# Patient Record
Sex: FEMALE | Race: WHITE
[De-identification: names, ages, dates, MRNs, and addresses within clinical notes are randomized per-mention and may not be internally consistent; named-entity substitution may affect disease eponyms.]

---

## 2020-06-24 ENCOUNTER — HOSPITAL ENCOUNTER (EMERGENCY)
Dept: HOSPITAL 74 - JER | Age: 75
Discharge: HOME | End: 2020-06-24
Payer: COMMERCIAL

## 2020-06-24 VITALS — SYSTOLIC BLOOD PRESSURE: 141 MMHG | DIASTOLIC BLOOD PRESSURE: 65 MMHG | HEART RATE: 57 BPM | TEMPERATURE: 97.4 F

## 2020-06-24 VITALS — BODY MASS INDEX: 40.4 KG/M2

## 2020-06-24 DIAGNOSIS — M25.561: Primary | ICD-10-CM

## 2020-06-24 LAB
ALBUMIN SERPL-MCNC: 3.7 G/DL (ref 3.4–5)
ALP SERPL-CCNC: 152 U/L (ref 45–117)
ALT SERPL-CCNC: 35 U/L (ref 13–61)
ANION GAP SERPL CALC-SCNC: 7 MMOL/L (ref 8–16)
APTT BLD: 28.2 SECONDS (ref 25.2–36.5)
AST SERPL-CCNC: 15 U/L (ref 15–37)
BASOPHILS # BLD: 1.1 % (ref 0–2)
BILIRUB SERPL-MCNC: 0.4 MG/DL (ref 0.2–1)
BUN SERPL-MCNC: 18.2 MG/DL (ref 7–18)
CALCIUM SERPL-MCNC: 9.3 MG/DL (ref 8.5–10.1)
CHLORIDE SERPL-SCNC: 107 MMOL/L (ref 98–107)
CO2 SERPL-SCNC: 27 MMOL/L (ref 21–32)
CREAT SERPL-MCNC: 1.1 MG/DL (ref 0.55–1.3)
DEPRECATED RDW RBC AUTO: 13.8 % (ref 11.6–15.6)
EOSINOPHIL # BLD: 3.4 % (ref 0–4.5)
GLUCOSE SERPL-MCNC: 133 MG/DL (ref 74–106)
HCT VFR BLD CALC: 37.5 % (ref 32.4–45.2)
HGB BLD-MCNC: 12.4 GM/DL (ref 10.7–15.3)
INR BLD: 0.86 (ref 0.83–1.09)
LYMPHOCYTES # BLD: 35.5 % (ref 8–40)
MCH RBC QN AUTO: 29.7 PG (ref 25.7–33.7)
MCHC RBC AUTO-ENTMCNC: 33.1 G/DL (ref 32–36)
MCV RBC: 89.9 FL (ref 80–96)
MONOCYTES # BLD AUTO: 11.4 % (ref 3.8–10.2)
NEUTROPHILS # BLD: 48.6 % (ref 42.8–82.8)
PLATELET # BLD AUTO: 235 K/MM3 (ref 134–434)
PMV BLD: 8.3 FL (ref 7.5–11.1)
POTASSIUM SERPLBLD-SCNC: 4.2 MMOL/L (ref 3.5–5.1)
PROT SERPL-MCNC: 6.7 G/DL (ref 6.4–8.2)
PT PNL PPP: 10.1 SEC (ref 9.7–13)
RBC # BLD AUTO: 4.17 M/MM3 (ref 3.6–5.2)
SODIUM SERPL-SCNC: 141 MMOL/L (ref 136–145)
WBC # BLD AUTO: 5.7 K/MM3 (ref 4–10)

## 2020-06-24 NOTE — PDOC
Rapid Medical Evaluation


Chief Complaint: Pain, Acute


Time Seen by Provider: 06/24/20 20:39


Medical Evaluation: 





06/24/20 20:41


74  year old female pmhx HTn on ASA complaiing of 1 day of right calf pain and 

swelling, worse with ambulation.  Negative DVT risk factors.  Denies injury to 

leg, CP SOB f/c.





PE:  Right calf tense on lateral aspect ttp 


Left calf supple 





Plan:DVT R/o





US venous doppler 


Basic Labs if positive 





Pt to precede to ED for eval

## 2020-06-24 NOTE — PDOC
History of Present Illness





- General


Chief Complaint: Pain, Acute


Stated Complaint: RT KNEE PAIN


Time Seen by Provider: 06/24/20 20:39


History Source: Patient





- History of Present Illness


Initial Comments: 





06/24/20 21:02


74F w/hx HTN p/w one day of RLE pain, tenderness. No hx prior DVTs, denies any 

recent travel, car rides, hospital stays, [place] history, shortness of breath, chest

pain, palpitations, fevers, chills, weakness, confusion. 





Past History





- Medical History


Allergies/Adverse Reactions: 


                                    Allergies











Allergy/AdvReac Type Severity Reaction Status Date / Time


 


No Known Allergies Allergy   Verified 06/24/20 20:42














- Psycho-Social/Smoking History


Smoking History: Never smoked





- Substance Abuse Hx (Audit-C & DAST Scrn)


How often the patient has a drink containing alcohol: Monthly or less


Score: In Men: 4 or > Positive; In Women: 3 or > Positive: 1


Screen Result (Pos requires Nsg. Audit-10AR): Negative


In the last yr the pt used illegal drug/Rx for NonMed reason: No


Score:  Yes response is considered Positive: 0


Screen Result (Positive result requires Nsg. DAST-10): Negative





**Review of Systems





- Review of Systems


Able to Perform ROS?: Yes


Comments:: 





06/24/20 21:03


GENERAL/CONSTITUTIONAL: No fever or chills. No weakness.


HEAD, EYES, EARS, NOSE AND THROAT: No change in vision. No ear pain or 

discharge. No sore throat.


CARDIOVASCULAR: No chest pain or shortness of breath


RESPIRATORY: No cough, wheezing, or hemoptysis.


GASTROINTESTINAL: No nausea, vomiting, diarrhea or constipation.


GENITOURINARY: No dysuria, frequency, or change in urination.


MUSCULOSKELETAL: RLE pain. No other joint or muscle swelling or pain. No neck or

back pain.


SKIN: No rash


NEUROLOGIC: No headache, vertigo, loss of consciousness, or change in 

strength/sensation.


ENDOCRINE: No increased thirst. No abnormal weight change


HEMATOLOGIC/LYMPHATIC: No anemia, easy bleeding, or history of blood clots.


ALLERGIC/IMMUNOLOGIC: No hives or skin allergy.











*Physical Exam





- Vital Signs


                                Last Vital Signs











Temp Pulse Resp BP Pulse Ox


 


 98.4 F   60   20   146/69   96 


 


 06/24/20 20:40  06/24/20 20:40  06/24/20 20:40  06/24/20 20:40  06/24/20 20:40














- Physical Exam





06/24/20 21:03


GENERAL: Awake, alert, and fully oriented, in no acute distress


HEAD: No signs of trauma, normocephalic, atraumatic 


EYES: PERRLA, EOMI, sclera anicteric, conjunctiva clear


ENT: Auricles normal inspection, hearing grossly normal, nares patent, 

oropharynx clear without exudates. Moist mucosa


NECK: Normal ROM, supple, no lymphadenopathy, JVD, or masses


LUNGS: No distress, speaks full sentences, clear to auscultation bilaterally 


HEART: Regular rate and rhythm, normal S1 and S2, no murmurs, rubs or gallops, 

peripheral pulses normal and equal bilaterally. 


ABDOMEN: Soft, nontender, normoactive bowel sounds.  No guarding, no rebound.  

No masses


EXTREMITIES: Tenderness to R posterior ankle, R knee. Normal inspection, Normal 

range of motion, no edema.  No clubbing or cyanosis


NEUROLOGICAL: Cranial nerves II through XII grossly intact.  Normal speech, 

normal gait, no focal sensorimotor deficits 


SKIN: Warm, Dry, normal turgor, no rashes or lesions noted











ED Treatment Course





- LABORATORY


CBC & Chemistry Diagram: 


                                 06/24/20 21:21





                                 06/24/20 21:21





Medical Decision Making





- Medical Decision Making





06/24/20 21:05


74F w/hx HTN p/w one day of RLE pain, tenderness on exam, RLE swelling 

concerning for DVT. 





Plan: 


Bilateral LE duplex US


CBC


CMP


PT/INR, APTT





Dispo: 


Discharge








06/24/20 21:54


US negative for DVT


Plan for R Knee XR





06/24/20 22:26


No acute process visualized on knee XR. 


Plan for discharge with orthopedics follow up. 


Knee brace offered to patient, but declined. 











Discharge





- Discharge Information


Problems reviewed: Yes


Clinical Impression/Diagnosis: 


Knee pain


Qualifiers:


 Chronicity: acute Laterality: right Qualified Code(s): M25.561 - Pain in right 

knee





Condition: Stable


Disposition: HOME





- Admission


No





- Follow up/Referral


Referrals: 


Cristy Shields [Primary Care Provider] - 


Drew Hargrove DO [Staff Physician] - 





- Patient Discharge Instructions


Patient Printed Discharge Instructions:  [facility] for Knee Pain


Additional Instructions: 


You were seen in the ER for knee and leg pain. Your ultrasound did not show any 

signs of DVT at this time. Your bloodwork was normal. Your XR was normal. Please

return to the ER if you develop worsening pain, are unable to walk, develop any 

shortness of breath, palpitations, weakness, confusion, chest pain, or trouble 

breathing. Follow up with your primary care provider as soon as possible, in the

next 2-3 days. We are giving you referral for orthopedics - please follow up as 

soon as possible, in the next 2-3 days. 





- Post Discharge Activity

## 2020-06-24 NOTE — PDOC
Attending Attestation





- Resident


Resident Name: Larry Rowell





- ED Attending Attestation


I have performed the following: I have examined & evaluated the patient, The 

case was reviewed & discussed with the resident, I agree w/resident's findings &

plan





- HPI


HPI: 





06/24/20 22:18


see resident hpi





- Physicial Exam


PE: 





06/24/20 22:18


see resident exam





- Medical Decision Making





06/24/20 22:18


74-year-old female with pain to the right posterior knee


Ultrasound is negative


X-ray shows no dislocation or fracture


Circular opacity likely artifact related to overlying clothing


We will DC with knee immobilizer and orthopedic follow-up





Discharge





- Discharge Information


Problems reviewed: Yes


Clinical Impression/Diagnosis: 


Knee pain


Qualifiers:


 Chronicity: acute Laterality: right Qualified Code(s): M25.561 - Pain in right 

knee





Disposition: HOME





- Follow up/Referral


Referrals: 


Drew Hargrove DO [Staff Physician] - 


Cristy Shields [Primary Care Provider] - 





- Patient Discharge Instructions


Patient Printed Discharge Instructions:  DI for Knee Pain


Additional Instructions: 


You were seen in the ER for knee and leg pain. Your ultrasound did not show any 

signs of DVT at this time. Your bloodwork was normal. Your XR was normal. Please

return to the ER if you develop worsening pain, are unable to walk, develop any 

shortness of breath, palpitations, weakness, confusion, chest pain, or trouble 

breathing. Follow up with your primary care provider as soon as possible, in the

next 2-3 days. We are giving you referral for orthopedics - please follow up as 

soon as possible, in the next 2-3 days. 





- Post Discharge Activity

## 2022-08-26 ENCOUNTER — HOSPITAL ENCOUNTER (EMERGENCY)
Dept: HOSPITAL 74 - JER | Age: 77
Discharge: HOME | End: 2022-08-26
Payer: COMMERCIAL

## 2022-08-26 VITALS
DIASTOLIC BLOOD PRESSURE: 62 MMHG | SYSTOLIC BLOOD PRESSURE: 146 MMHG | RESPIRATION RATE: 16 BRPM | HEART RATE: 55 BPM | TEMPERATURE: 98.2 F

## 2022-08-26 VITALS — BODY MASS INDEX: 39.6 KG/M2

## 2022-08-26 DIAGNOSIS — M79.662: Primary | ICD-10-CM

## 2023-02-20 ENCOUNTER — RX ONLY (RX ONLY)
Age: 78
End: 2023-02-20

## 2023-02-20 ENCOUNTER — OFFICE (OUTPATIENT)
Dept: URBAN - METROPOLITAN AREA CLINIC 30 | Facility: CLINIC | Age: 78
Setting detail: OPHTHALMOLOGY
End: 2023-02-20
Payer: MEDICARE

## 2023-02-20 DIAGNOSIS — H40.1132: ICD-10-CM

## 2023-02-20 DIAGNOSIS — H26.40: ICD-10-CM

## 2023-02-20 DIAGNOSIS — H47.233: ICD-10-CM

## 2023-02-20 PROCEDURE — 92250 FUNDUS PHOTOGRAPHY W/I&R: CPT | Performed by: OPHTHALMOLOGY

## 2023-02-20 PROCEDURE — 92004 COMPRE OPH EXAM NEW PT 1/>: CPT | Performed by: OPHTHALMOLOGY

## 2023-02-20 ASSESSMENT — REFRACTION_MANIFEST
OS_AXIS: 175
OD_VA1: 20/80
OD_CYLINDER: +0.50
OS_CYLINDER: +0.50
OD_AXIS: 175
OD_SPHERE: -1.25
OS_SPHERE: -1.25
OS_VA1: 20/50-1

## 2023-02-20 ASSESSMENT — VISUAL ACUITY
OD_BCVA: 20/60-2
OS_BCVA: 20/70-2

## 2023-02-20 ASSESSMENT — REFRACTION_AUTOREFRACTION
OS_SPHERE: -1.25
OS_CYLINDER: +0.50
OS_AXIS: 175

## 2023-02-20 ASSESSMENT — SPHEQUIV_DERIVED
OD_SPHEQUIV: -1
OS_SPHEQUIV: -1
OS_SPHEQUIV: -1

## 2023-02-20 ASSESSMENT — TONOMETRY
OS_IOP_MMHG: 14
OD_IOP_MMHG: 14

## 2023-02-20 ASSESSMENT — CONFRONTATIONAL VISUAL FIELD TEST (CVF)
OD_FINDINGS: FULL
OS_FINDINGS: FULL

## 2023-02-24 ENCOUNTER — OFFICE (OUTPATIENT)
Dept: URBAN - METROPOLITAN AREA CLINIC 30 | Facility: CLINIC | Age: 78
Setting detail: OPHTHALMOLOGY
End: 2023-02-24
Payer: MEDICARE

## 2023-02-24 ENCOUNTER — RX ONLY (RX ONLY)
Age: 78
End: 2023-02-24

## 2023-02-24 DIAGNOSIS — H26.491: ICD-10-CM

## 2023-02-24 PROBLEM — H26.40 POSTERIOR CAPSULE CLOUDING / OPACIFICATION: Status: ACTIVE | Noted: 2023-02-20

## 2023-02-24 PROBLEM — H47.233 CUPPING OF THE OPTIC NERVE; BOTH EYES: Status: ACTIVE | Noted: 2023-02-20

## 2023-02-24 PROCEDURE — 66821 AFTER CATARACT LASER SURGERY: CPT | Performed by: OPHTHALMOLOGY

## 2023-02-24 ASSESSMENT — REFRACTION_MANIFEST
OD_CYLINDER: +0.50
OS_VA1: 20/50-1
OD_SPHERE: -1.25
OD_AXIS: 175
OS_SPHERE: -1.25
OS_AXIS: 175
OS_CYLINDER: +0.50
OD_VA1: 20/80

## 2023-02-24 ASSESSMENT — REFRACTION_AUTOREFRACTION
OS_SPHERE: -1.25
OS_CYLINDER: +0.50
OS_AXIS: 175

## 2023-02-24 ASSESSMENT — SPHEQUIV_DERIVED
OS_SPHEQUIV: -1
OS_SPHEQUIV: -1
OD_SPHEQUIV: -1

## 2023-02-24 ASSESSMENT — CONFRONTATIONAL VISUAL FIELD TEST (CVF)
OS_FINDINGS: FULL
OD_FINDINGS: FULL

## 2023-02-24 ASSESSMENT — VISUAL ACUITY
OS_BCVA: 20/70-2
OD_BCVA: 20/60-2

## 2024-10-27 ENCOUNTER — HOSPITAL ENCOUNTER (EMERGENCY)
Dept: HOSPITAL 74 - JER | Age: 79
LOS: 1 days | Discharge: HOME | End: 2024-10-28
Payer: COMMERCIAL

## 2024-10-27 VITALS — RESPIRATION RATE: 16 BRPM | TEMPERATURE: 98 F

## 2024-10-27 VITALS — BODY MASS INDEX: 39.6 KG/M2

## 2024-10-27 DIAGNOSIS — Z20.822: ICD-10-CM

## 2024-10-27 DIAGNOSIS — R05.9: ICD-10-CM

## 2024-10-27 DIAGNOSIS — R06.2: Primary | ICD-10-CM

## 2024-10-27 RX ADMIN — DEXAMETHASONE ONE MG: 4 TABLET ORAL at 22:23

## 2024-10-27 RX ADMIN — IPRATROPIUM BROMIDE AND ALBUTEROL SULFATE SCH AMP: .5; 3 SOLUTION RESPIRATORY (INHALATION) at 22:23

## 2024-10-28 VITALS — SYSTOLIC BLOOD PRESSURE: 132 MMHG | DIASTOLIC BLOOD PRESSURE: 68 MMHG | HEART RATE: 69 BPM

## 2024-10-28 LAB
ALBUMIN SERPL-MCNC: 3.5 G/DL (ref 3.4–5)
ALP SERPL-CCNC: 141 U/L (ref 45–117)
ALT SERPL-CCNC: 23 U/L (ref 13–61)
ANION GAP SERPL CALC-SCNC: 6 MMOL/L (ref 4–13)
AST SERPL-CCNC: 13 U/L (ref 15–37)
BASOPHILS # BLD: 0.5 % (ref 0–2)
BILIRUB SERPL-MCNC: 0.6 MG/DL (ref 0.2–1)
BUN SERPL-MCNC: 16.5 MG/DL (ref 7–18)
CALCIUM SERPL-MCNC: 9.2 MG/DL (ref 8.5–10.1)
CHLORIDE SERPL-SCNC: 106 MMOL/L (ref 98–107)
CO2 SERPL-SCNC: 27 MMOL/L (ref 21–32)
CREAT SERPL-MCNC: 1.1 MG/DL (ref 0.55–1.3)
DEPRECATED RDW RBC AUTO: 13.7 % (ref 11.6–15.6)
EOSINOPHIL # BLD: 0.8 % (ref 0–4.5)
GLUCOSE SERPL-MCNC: 138 MG/DL (ref 74–106)
HCT VFR BLD CALC: 39.4 % (ref 32.4–45.2)
HGB BLD-MCNC: 12.9 GM/DL (ref 10.7–15.3)
LYMPHOCYTES # BLD: 9.5 % (ref 8–40)
MCH RBC QN AUTO: 29.1 PG (ref 25.7–33.7)
MCHC RBC AUTO-ENTMCNC: 32.7 G/DL (ref 32–36)
MCV RBC: 88.9 FL (ref 80–96)
MONOCYTES # BLD AUTO: 4.7 % (ref 3.8–10.2)
NEUTROPHILS # BLD: 84.5 % (ref 42.8–82.8)
PLATELET # BLD AUTO: 216 10^3/UL (ref 134–434)
PMV BLD: 7.6 FL (ref 7.5–11.1)
POTASSIUM SERPLBLD-SCNC: 3.9 MMOL/L (ref 3.5–5.1)
PROT SERPL-MCNC: 6.7 G/DL (ref 6.4–8.2)
RBC # BLD AUTO: 4.43 M/MM3 (ref 3.6–5.2)
SODIUM SERPL-SCNC: 139 MMOL/L (ref 136–145)
WBC # BLD AUTO: 11.8 K/MM3 (ref 4–10)